# Patient Record
Sex: FEMALE | Race: WHITE | URBAN - METROPOLITAN AREA
[De-identification: names, ages, dates, MRNs, and addresses within clinical notes are randomized per-mention and may not be internally consistent; named-entity substitution may affect disease eponyms.]

---

## 2019-01-14 ENCOUNTER — VIRTUAL VISIT (OUTPATIENT)
Dept: FAMILY MEDICINE | Facility: OTHER | Age: 24
End: 2019-01-14

## 2019-01-14 NOTE — PROGRESS NOTES
"Date:   Clinician: Mahad Faria  Clinician NPI: [id number]  Patient: Stephanie Galarza  Patient : 1995  Patient Address: [address] [place] [address]  Patient Phone: [phone]  Visit Protocol: Low back pain  Patient Summary:  Stephanie is a 23 year old ( : 1995 ) female who initiated a Visit for evaluation of Low Back Pain. When asked the question \"Please sign me up to receive news, health information and promotions from [facility].\", Stephanie responded \"Yes\".    Her back pain began suddenly 1-6 days ago. The pain is present on both sides and is located in the low back area.   The pain varies depending on the activity. The pain decreases when she bends forward.   She is able to walk on her toes and is able to walk on her heels with her toes lifted off the ground.     Symptom Details   Pain: The pain is severe (7-9 on a 10 point pain scale).    Stephanie denies feeling feverish, loss of bladder control, a rash in the same area as the back pain, abdominal pain, back muscle spasms, loss of bowel control, saddle anesthesia, numbness or tingling in the legs, shooting pain, urinary frequency, vomiting, urinary retention, leg weakness, dysuria, and hematuria.   Precipitating events  The back pain began in response to an injury that happened at her work. Her back pain began after sports injury.   Pertinent medical history  Stephanie has had similar episodes of back pain in the past. She has not had cancer, back surgery, unintended weight loss in the last three months, and kidney stones.   She has not seen a provider to treat this episode of low back pain.   Treatments  Stephanie tried using over-the-counter medications and therapeutic remedies (such as cold pack, heat, chiropractic treatment, physical therapy) to manage her low back pain.   Additional details about medications tried as reported by the patient (free text): advil   Other approaches used to manage the back pain as reported by the patient (free " "text): heat, somewhat effective   Stephanie has not tried using prescription medications to manage her back pain.    She denies pregnancy and denies breastfeeding. She has menstruated in the past month.   Stephanie does not smoke or use smokeless tobacco.   Additional information as reported by the patient (free text): pain began after I laid down post run and stood up. It is a \"threw out my back pain\"   MEDICATIONS: No current medications, ALLERGIES: NKDA  Clinician Response:  Dear Stephanie,  Based on the information you provided, you likely have Mechanical Low Back Pain.   Low back pain that is caused by placing abnormal stress and muscle or soft tissue strain (also known as mechanical low back pain) is the most common form of back pain. The majority of cases are not related with a specific disease or abnormal structure of the spine and do not require diagnostic imaging (such as an X-ray, MRI or CAT scan). Heating, cooling, back exercises and stretches should reduce your back pain within 3-4 weeks. During this time, remain active.   Medication information  I am prescribing:     Methocarbamol (Robaxin-750) 750 mg oral tablet. Take 2 tablets by mouth every 6 hours for 2 days, then 1 tablet every 6 hours for 5 days. There are no refills with this prescription.   Unless you are allergic to the over-the-counter medication(s) below, I recommend using:     Naproxen sodium (Aleve or store brand) 220 mg oral tablet. Take 1 tablet by mouth every 8 to 12 hours. You may take 2 tablets for the first dose. Do not exceed 3 tablets in 24 hours period.   Taking this medication with food will decrease the risk of stomach discomfort.  Over-the-counter medications do not require a prescription. Ask the pharmacist if you have any questions.  Self care  The following tips will help prevent and reduce back pain:     Apply heating pads on the sore area for a short duration (10 minutes per hour and as needed). A hot bath or a heating pad on your lower " back may also help reduce pain and stiffness.    Maintaining a good posture reduces stress on the back muscles. To help reduce the stress on your back:    Stand and sit up straight.    Try not to slouch when standing or sitting.    Try not to stay in the same position for a long time.    Switch positions every 20 to 30 minutes if possible.    When lifting heavy objects, squat down and use your legs rather than bending at the waist.          Stress can make your back pain worse. For this reason, take time to relax and try some relaxation techniques when you feel stressed.    Click the following link for more information: Prevent back pain.     When to seek care  Please make an appointment to be seen in a clinic or urgent care if any of the following occur:     Pain that doesn't seem to be getting better after 3 to 4 weeks    You develop new symptoms or your symptoms becomes worse    A painful rash that appears as a stripe along one side of the body    Unexplained fever    Unbearable pain    Unrelenting night pain     Seek immediate medical care if you have problems with bowel or bladder control, worsening weakness or numbness in your legs, or numbness in the inner thighs, groin, or buttocks.   Diagnosis: Mechanical low back pain  Diagnosis ICD: M54.5  Prescription: methocarbamol (Robaxin-750) 750 mg oral tablet 36 tablet, 7 days supply. Take 2 tablets by mouth every 6 hours for 2 days, then 1 tablet every 6 hours for 5 days. Refills: 0, Refill as needed: no, Allow substitutions: yes  Pharmacy: [facility] - [phone] - [address] [place] [address]